# Patient Record
Sex: FEMALE | Race: WHITE | URBAN - METROPOLITAN AREA
[De-identification: names, ages, dates, MRNs, and addresses within clinical notes are randomized per-mention and may not be internally consistent; named-entity substitution may affect disease eponyms.]

---

## 2020-03-10 ENCOUNTER — OFFICE VISIT (OUTPATIENT)
Dept: URBAN - METROPOLITAN AREA CLINIC 71 | Facility: CLINIC | Age: 70
End: 2020-03-10
Payer: COMMERCIAL

## 2020-03-10 DIAGNOSIS — H11.153 PINGUECULA, BILATERAL: ICD-10-CM

## 2020-03-10 DIAGNOSIS — H43.813 VITREOUS DEGENERATION, BILATERAL: ICD-10-CM

## 2020-03-10 DIAGNOSIS — H52.4 PRESBYOPIA: Primary | ICD-10-CM

## 2020-03-10 DIAGNOSIS — H25.13 AGE-RELATED NUCLEAR CATARACT, BILATERAL: ICD-10-CM

## 2020-03-10 PROCEDURE — 92004 COMPRE OPH EXAM NEW PT 1/>: CPT | Performed by: OPTOMETRIST

## 2020-03-10 ASSESSMENT — INTRAOCULAR PRESSURE
OS: 17
OD: 17

## 2020-03-10 ASSESSMENT — VISUAL ACUITY
OS: 20/20
OD: 20/20

## 2020-03-10 NOTE — IMPRESSION/PLAN
Impression: Vitreous degeneration, bilateral: H43.813. Bilateral. Plan: Contact the office if symptoms worsen, including increased floaters, flashing light, loss of vision or a black curtain blocking your field of vision occurs.

## 2020-03-10 NOTE — IMPRESSION/PLAN
Impression: Pinguecula, bilateral: H11.153. Bilateral. Plan: Pingueculae are localized areas of sun damage on the conjunctiva, often on the inner and outer edges of the cornea.

## 2020-03-10 NOTE — IMPRESSION/PLAN
Impression: Presbyopia: H52.4. Bilateral. Plan: Presbyopia is the inability to focus on objects (ie: accommodate) due to the loss of flexibility of your natural lens. Presbyopia occurs with age. Reading glasses, bifocals, trifocals or contacts can be helpful. Contact the office if difficulty focusing persists despite corrective eye wear. Pt prefers OTC readers, recommend +2.50.

## 2020-03-10 NOTE — IMPRESSION/PLAN
Impression: Age-related nuclear cataract, bilateral: H25.13. Bilateral. Plan: Cataracts account for the patient's complaints. No treatment currently recommended. The patient will monitor vision changes and contact us with any decrease in vision.